# Patient Record
Sex: FEMALE | Race: WHITE
[De-identification: names, ages, dates, MRNs, and addresses within clinical notes are randomized per-mention and may not be internally consistent; named-entity substitution may affect disease eponyms.]

---

## 2017-02-27 ENCOUNTER — HOSPITAL ENCOUNTER (OUTPATIENT)
Dept: HOSPITAL 80 - BHLMT | Age: 82
End: 2017-02-27
Attending: INTERNAL MEDICINE
Payer: COMMERCIAL

## 2017-02-27 DIAGNOSIS — Z79.01: ICD-10-CM

## 2017-02-27 DIAGNOSIS — I49.5: ICD-10-CM

## 2017-02-27 DIAGNOSIS — Z95.0: ICD-10-CM

## 2017-02-27 DIAGNOSIS — I48.0: Primary | ICD-10-CM

## 2017-02-27 DIAGNOSIS — Z51.81: ICD-10-CM

## 2017-04-11 ENCOUNTER — HOSPITAL ENCOUNTER (OUTPATIENT)
Dept: HOSPITAL 80 - BHLMT | Age: 82
End: 2017-04-11
Attending: INTERNAL MEDICINE
Payer: COMMERCIAL

## 2017-04-11 DIAGNOSIS — I48.91: Primary | ICD-10-CM

## 2017-04-11 DIAGNOSIS — Z95.0: ICD-10-CM

## 2018-04-15 ENCOUNTER — HOSPITAL ENCOUNTER (EMERGENCY)
Dept: HOSPITAL 80 - FED | Age: 83
Discharge: HOME | End: 2018-04-15
Payer: COMMERCIAL

## 2018-04-15 VITALS — SYSTOLIC BLOOD PRESSURE: 143 MMHG | DIASTOLIC BLOOD PRESSURE: 73 MMHG

## 2018-04-15 DIAGNOSIS — Z95.0: ICD-10-CM

## 2018-04-15 DIAGNOSIS — H53.8: ICD-10-CM

## 2018-04-15 DIAGNOSIS — Z79.01: ICD-10-CM

## 2018-04-15 DIAGNOSIS — I10: Primary | ICD-10-CM

## 2018-04-15 LAB
INR PPP: 3.72 (ref 0.83–1.16)
PLATELET # BLD: 223 10^3/UL (ref 150–400)
PROTHROMBIN TIME: 36.5 SEC (ref 12–15)

## 2018-04-15 NOTE — CPEKG
Heart Rate: 66

RR Interval: 909

P-R Interval: 228

QRSD Interval: 106

QT Interval: 416

QTC Interval: 436

P Axis: 0

QRS Axis: -25

T Wave Axis: 130

EKG Severity - ABNORMAL ECG -

EKG Impression: ATRIAL-PACED COMPLEXES

EKG Impression: FIRST DEGREE AV BLOCK

EKG Impression: LEFT VENTRICULAR HYPERTROPHY

EKG Impression: ANTERIOR Q WAVES, POSSIBLY DUE TO LVH

EKG Impression: LATERAL LEADS ARE ALSO INVOLVED

Electronically Signed By: McCollester, Laughlin 15-Apr-2018 23:15:05

## 2018-04-15 NOTE — EDPHY
H & P


Time Seen by Provider: 04/15/18 19:49


HPI/ROS: 





HPI


High blood pressure comma transient blurry vision.





88-year-old female by private vehicle with her .  This patient noticed 

that earlier this morning her blood pressure was elevated at 150 systolic.  She 

reports that this afternoon her blood pressure was elevated at 180 systolic.  

She reports that she had a transient episode of blurry vision.  She describes 

this is having difficulty focusing.  She reports that this lasted 30-45 

minutes.  She denies any blurry vision now.  No other associated signs or 

symptoms.  No other aggravating or alleviating factors.





ROS:





Constitutional:  No fever, no chills.  No weakness.


Eyes:  No discharge.  As above.


ENT:  No sore throat.  No nasal congestion or rhinorrhea.


Respiratory:  No cough.  No shortness of breath.


Cardiac:  No chest pain, no palpitations.


Gastrointestinal:  No abdominal pain, no vomiting, no diarrhea.


Genitourinary:  No hematuria.  No dysuria or increased frequency with urination.


Musculoskeletal:  No back pain.  No neck pain.  No myalgias or arthralgias.


Skin:  No rashes.


Neurological:  No headache.  No focal weakness or altered sensation.





Past medical history:  Hypertension, pacemaker.  On warfarin propafenone and 

digoxin.





Social history:  Nonsmoker.  No alcohol.  Here with her .





Physical Exam:





General Appearance:  Alert, no distress.  This patient is responding to 

questions appropriately and in full sentences.  This patient appears well-

hydrated and well-nourished.


Eyes:  Pupils equal and round and reactive to light at 3-2 mm, no pallor or 

injection.  No lid edema, erythema or injection.  No nystagmus.  No photophobia.


Respiratory:  There are no retractions, lungs are clear to auscultation with 

good air movement bilaterally.


Cardiovascular:  Regular rate and rhythm.  No murmur.


Gastrointestinal:  Abdomen is soft and nontender, no masses, bowel sounds 

normal.  No focal tenderness at McBurney's point.  No Marino sign.


Neurological:  Motor sensory function is grossly intact.  Cranial nerves are 

normal.  Finger to nose, heel to shin intact.  Gait is normal.


Skin:  Warm and dry, no rashes.


Musculoskeletal:  Neck is supple and nontender.


Extremities are symmetrical.  All joints range without pain or impingement.


Psychiatric:  No agitation.  No depression.





Database:





EKG:





EKG time is 7:50 p.m.; EKG shows a narrow complex atrial paced rhythm with a 

ventricular rate of 66.  1st degree AV block noted.  The QRS, QT intervals are 

within normal limits.  Left ventricular hypertrophy noted.  Anterior Q-waves 

present.  There are no ST-T wave changes indicative of ischemic or injury 

pattern.  No evidence of right heart strain.  Interpreted by me.





Imaging:





CT scan of head without contrast:  Negative.  Results were discussed with staff 

radiologist Dr. Terrence Kaufman.





Carotid Doppler ultrasounds:  Negative for hemodynamically significant 

stenosis.  Results were discussed with staff radiologist Dr. Terrence Kaufman.





Procedures:





Emergency department course:





IV placed.  Vital signs reviewed.  Patient currently asymptomatic.  She 

consents to CT imaging.  EKG obtained and reviewed by myself.  Doppler carotid 

ultrasounds to be obtained as well.





8:30 p.m., carotid artery Dopplers being obtained.  Blood pressure currently 132

/84.  Patient denies any complaints.





10:00 p.m., patient re-evaluated.  Blood pressure 143/73.  Vital signs 

otherwise normal.  She is feeling comfortable at this time.  No complaints.  

Repeat neurologic Assessment is nonfocal.  Results of all of her emergency 

department workup discussed with her and her .  She feels comfortable 

going home and is requesting discharge.  I will have her follow up with her 

primary care physician or cardiologist for re-evaluation and recheck of her 

blood pressure.  Return to emergency department precautions have been reviewed 

with her.  All of her questions were answered.  She was discharged in good 

condition with her .





Differential Diagnosis:





The differential diagnosis on this patient includes but is not limited to 

possible transient ischemic attack versus hypertensive urgency.  This 

represents a partial list of diagnoses considered.  These considerations are 

based on history, physical exam, past history, reassessment and diagnostic 

testing.


Smoking Status: Never smoked


Constitutional: 


 Initial Vital Signs











Temperature (C)  36.4 C   04/15/18 19:37


 


Heart Rate  81   04/15/18 19:37


 


Respiratory Rate  16   04/15/18 19:37


 


Blood Pressure  180/87 H  04/15/18 19:37


 


O2 Sat (%)  94   04/15/18 19:37








 











O2 Delivery Mode               Room Air














Allergies/Adverse Reactions: 


 





No Known Allergies Allergy (Unverified 01/30/13 10:43)


 








Home Medications: 














 Medication  Instructions  Recorded


 


Digoxin  04/15/18


 


Levothyroxine  04/15/18


 


Lovastatin  04/15/18


 


Propafenone HCl  04/15/18


 


Warfarin Sodium  04/15/18














Medical Decision Making





- Diagnostics


Imaging Results: 


 Imaging Impressions





Carotid Doppler Study  04/15/18 20:03


Impression:


No hemodynamically significant stenosis by systolic velocity criteria.


 


Measurement of carotid stenosis is based on velocity parameters that correlate 

the residual internal carotid diameter with [employer] Symptomatic Carotid 

Endarterectomy Trial (NASCET) based stenosis levels.


 


Findings discussed with Laughlin B McCollester, MD 4/15/2018 at 21:08. 








Head CT  04/15/18 20:03


Impression:


1. No acute intracranial findings.


2. Diffuse cerebral atrophy with periventricular and subcortical low 

attenuation consistent with chronic microvascular ischemic gliosis.


 


Findings discussed with Laughlin B McCollester, MD 4/15/2018 at 21:08. 














- Data Points


Laboratory Results: 


 Laboratory Results





 04/15/18 19:50 





 04/15/18 19:50 





 











  04/15/18 04/15/18 04/15/18





  19:50 19:50 19:50


 


WBC      4.34 10^3/uL 10^3/uL





     (3.80-9.50) 


 


RBC      5.06 10^6/uL 10^6/uL





     (4.18-5.33) 


 


Hgb      14.4 g/dL g/dL





     (12.6-16.3) 


 


Hct      43.3 % %





     (38.0-47.0) 


 


MCV      85.6 fL fL





     (81.5-99.8) 


 


MCH      28.5 pg pg





     (27.9-34.1) 


 


MCHC      33.3 g/dL g/dL





     (32.4-36.7) 


 


RDW      14.9 % %





     (11.5-15.2) 


 


Plt Count      223 10^3/uL 10^3/uL





     (150-400) 


 


MPV      9.0 fL fL





     (8.7-11.7) 


 


Neut % (Auto)      63.6 % %





     (39.3-74.2) 


 


Lymph % (Auto)      23.5 % %





     (15.0-45.0) 


 


Mono % (Auto)      10.8 % %





     (4.5-13.0) 


 


Eos % (Auto)      1.4 % %





     (0.6-7.6) 


 


Baso % (Auto)      0.5 % %





     (0.3-1.7) 


 


Nucleat RBC Rel Count      0.0 % %





     (0.0-0.2) 


 


Absolute Neuts (auto)      2.76 10^3/uL 10^3/uL





     (1.70-6.50) 


 


Absolute Lymphs (auto)      1.02 10^3/uL 10^3/uL





     (1.00-3.00) 


 


Absolute Monos (auto)      0.47 10^3/uL 10^3/uL





     (0.30-0.80) 


 


Absolute Eos (auto)      0.06 10^3/uL 10^3/uL





     (0.03-0.40) 


 


Absolute Basos (auto)      0.02 10^3/uL 10^3/uL





     (0.02-0.10) 


 


Absolute Nucleated RBC      0.00 10^3/uL 10^3/uL





     (0-0.01) 


 


Immature Gran %      0.2 % %





     (0.0-1.1) 


 


Immature Gran #      0.01 10^3/uL 10^3/uL





     (0.00-0.10) 


 


PT    36.5 SEC H SEC  





    (12.0-15.0)  


 


INR    3.72  H   





    (0.83-1.16)  


 


APTT    55.7 SEC H SEC  





    (23.0-38.0)  


 


Sodium  136 mEq/L mEq/L    





   (135-145)   


 


Potassium  4.7 mEq/L mEq/L    





   (3.5-5.2)   


 


Chloride  102 mEq/L mEq/L    





   ()   


 


Carbon Dioxide  24 mEq/l mEq/l    





   (22-31)   


 


Anion Gap  10 mEq/L mEq/L    





   (8-16)   


 


BUN  11 mg/dL mg/dL    





   (7-23)   


 


Creatinine  0.5 mg/dL L mg/dL    





   (0.6-1.0)   


 


Estimated GFR  > 60     





    


 


Glucose  94 mg/dL mg/dL    





   ()   


 


Calcium  9.6 mg/dL mg/dL    





   (8.5-10.4)   


 


Digoxin  0.7 ng/mL L ng/mL    





   (0.8-2.0)   














Departure





- Departure


Disposition: Home, Routine, Self-Care


Clinical Impression: 


 Transient blurry vision, Hypertension





Condition: Good


Instructions:  Hypertension (ED), Blurred Vision (ED)


Additional Instructions: 


Read and follow provided instructions.





Follow-up with your primary care physician or cardiologist in 1-2 days for re-

evaluation and recheck of your blood pressure.





Your digoxin levels were also low and should be rechecked and you're dosing 

adjusted appropriately.





Your INR is high.  Your primary care physician or cardiologist should recheck 

this and adjust your dosing accordingly.





Take your medication as prescribed.





Return to the emergency department for return of symptoms, headache, loss of 

sensation or weakness in your extremities or other serious concerns.


Referrals: 


Lanny Paul MD [Primary Care Provider] - As per Instructions

## 2018-05-10 ENCOUNTER — HOSPITAL ENCOUNTER (OUTPATIENT)
Dept: HOSPITAL 80 - BHFA | Age: 83
End: 2018-05-10
Attending: INTERNAL MEDICINE
Payer: COMMERCIAL

## 2018-05-10 DIAGNOSIS — I48.91: Primary | ICD-10-CM

## 2019-01-29 ENCOUNTER — HOSPITAL ENCOUNTER (OUTPATIENT)
Dept: HOSPITAL 80 - BHLMT | Age: 84
End: 2019-01-29
Attending: INTERNAL MEDICINE
Payer: COMMERCIAL

## 2019-01-29 DIAGNOSIS — Z95.0: ICD-10-CM

## 2019-01-29 DIAGNOSIS — I48.0: Primary | ICD-10-CM

## 2019-01-29 DIAGNOSIS — Z79.01: ICD-10-CM

## 2019-01-29 DIAGNOSIS — Z79.899: ICD-10-CM

## 2019-01-29 DIAGNOSIS — I10: ICD-10-CM
